# Patient Record
(demographics unavailable — no encounter records)

---

## 2024-11-19 NOTE — HISTORY OF PRESENT ILLNESS
[Nausea] : no nausea [Vomiting] : no vomiting [Diarrhea] : no diarrhea [Constipation] : no constipation [Anorexia] : no anorexia [Sore Throat] : no sore throat [FreeTextEntry8] : Intermittent RLQ discomfort.  Thought it may have been related to mentruation/ovulation.  Pain is dull, intermittent.  BM are daily-every other day, no radiation.  Has history of inguinal hernia repair about 8 years ago.  Has not had colonscopy.

## 2024-11-19 NOTE — PHYSICAL EXAM
[Normal] : no acute distress, well nourished, well developed and well-appearing [Soft] : abdomen soft [Non Tender] : non-tender [Non-distended] : non-distended [No Masses] : no abdominal mass palpated [Normal Bowel Sounds] : normal bowel sounds [No Hernias] : no hernias

## 2025-01-21 NOTE — HISTORY OF PRESENT ILLNESS
[FreeTextEntry1] : Ms. DANIEL OLSON  is a 45 year old  female who has a past medical history significant for Morbid Obesity/Hyperlipidemia  who presents to the office for a follow up evaluation. Patient feels generally well today. Denies SOB, chest pain, palpitations, dizziness, and syncope.  ================ ================ 11/2023 ozempic 0.25mg - 5  following with NIMIS[facility] Oneil for years - stuck at the same weight, couldn't lose  no hx of thyroid CA in family  197lbs  ================= 12/2023 0.25mg ozempic - made a mistake and didnt increase  no side effects  ============= 1/2024 0.5mg ozempic- 2 doses feeling well  187lbs  son having surgery in [place] in feb will be back in march ============== 3/2024 sons sx went well 0.5mg - feeling well 177lbs  =================== 4/2024 1mg 170lbs  no side effects  =============== 6/2024 ozempic 1mg  161lbs  feeling well =============== 9/2024 1mg feeling well 148lbs walking the dog - could be working out more ==================== 11/2024 1mg feeling well 144lbs  =================== 1/2025 0.5mg reports more hunger  146lbs  needs to increase protein - only getting 20 g - needs 90g

## 2025-01-21 NOTE — DISCUSSION/SUMMARY
[FreeTextEntry1] : Obese  - Patient to continue Ozempic 0.5mg once weekly - titrating down   - A detailed discussion took place about the importance of CV risk reduction and LDL lowering.  - The patient understands the importance of incorporating moderate aerobic exercise, 4 times/week for 40 minutes to reduce risk of CV disease. - A detailed discussion of lifestyle modification was done today. Patient understands the importance of a heart healthy diet and incorporating veggies/legumes/fruits/whole grains and limiting processed foods, sugars and carbs in their diet. - patient understands that stress reduction along with good sleep hygiene will help aid in weight loss  -f/u in 8 weeks   - The patient has a good understanding of the diagnosis, treatment plan and lifestyle modification.  she will contact me at the office for any questions with their care or any changes in their health status.  Case discussed with Dr Marciano Garza

## 2025-03-19 NOTE — DISCUSSION/SUMMARY
[FreeTextEntry1] : Obese  - Patient to start Ozempic 1 mg once weekly  - A detailed discussion took place about the importance of CV risk reduction and LDL lowering.  - The patient understands the importance of incorporating moderate aerobic exercise, 4 times/week for 40 minutes to reduce risk of CV disease. - A detailed discussion of lifestyle modification was done today. Patient understands the importance of a heart healthy diet and incorporating veggies/legumes/fruits/whole grains and limiting processed foods, sugars and carbs in their diet. - patient understands that stress reduction along with good sleep hygiene will help aid in weight loss  -f/u in 4 weeks   - The patient has a good understanding of the diagnosis, treatment plan and lifestyle modification.  she will contact me at the office for any questions with their care or any changes in their health status.  Case discussed with Dr Marciano Garza

## 2025-03-19 NOTE — HISTORY OF PRESENT ILLNESS
[FreeTextEntry1] : Ms. DANIEL OLSON  is a 45 year old  female who has a past medical history significant for Morbid Obesity/Hyperlipidemia  who presents to the office for a follow up evaluation. Patient feels generally well today. Denies SOB, chest pain, palpitations, dizziness, and syncope.  ================ ================ 11/2023 ozempic 0.25mg - 5  following with [facility] Oneil for years - stuck at the same weight, couldn't lose  no hx of thyroid CA in family  197lbs  ================= 12/2023 0.25mg ozempic - made a mistake and didnt increase  no side effects  ============= 1/2024 0.5mg ozempic- 2 doses feeling well  187lbs  son having surgery in [place] in feb will be back in march ============== 3/2024 sons sx went well 0.5mg - feeling well 177lbs  =================== 4/2024 1mg 170lbs  no side effects  =============== 6/2024 ozempic 1mg  161lbs  feeling well =============== 9/2024 1mg feeling well 148lbs walking the dog - could be working out more ==================== 11/2024 1mg feeling well 144lbs  =================== 1/2025 0.5mg reports more hunger  146lbs  needs to increase protein - only getting 20 g - needs 90g  =======================    3/19/2025 MED:  Currently on   Ozempic  0.5 mg dose - Appetite suppression has decreased and is struggling at the 0.5 mg dosing. -Reports a 4 lb increase in weight - No side effects, feels well. - Will start 1 mg weekly dose and reassess in one month

## 2025-03-19 NOTE — HISTORY OF PRESENT ILLNESS
[FreeTextEntry1] : Ms. DANIEL OLSON  is a 45 year old  female who has a past medical history significant for Morbid Obesity/Hyperlipidemia  who presents to the office for a follow up evaluation. Patient feels generally well today. Denies SOB, chest pain, palpitations, dizziness, and syncope.  ================ ================ 11/2023 ozempic 0.25mg - 5  following with N[facility] Oneil for years - stuck at the same weight, couldn't lose  no hx of thyroid CA in family  197lbs  ================= 12/2023 0.25mg ozempic - made a mistake and didnt increase  no side effects  ============= 1/2024 0.5mg ozempic- 2 doses feeling well  187lbs  son having surgery in [place] in feb will be back in march ============== 3/2024 sons sx went well 0.5mg - feeling well 177lbs  =================== 4/2024 1mg 170lbs  no side effects  =============== 6/2024 ozempic 1mg  161lbs  feeling well =============== 9/2024 1mg feeling well 148lbs walking the dog - could be working out more ==================== 11/2024 1mg feeling well 144lbs  =================== 1/2025 0.5mg reports more hunger  146lbs  needs to increase protein - only getting 20 g - needs 90g  =======================    3/19/2025 MED:  Currently on   Ozempic  0.5 mg dose - Appetite suppression has decreased and is struggling at the 0.5 mg dosing. -Reports a 4 lb increase in weight - No side effects, feels well. - Will start 1 mg weekly dose and reassess in one month

## 2025-07-15 NOTE — DISCUSSION/SUMMARY
[FreeTextEntry1] : Obese  - Patient to continue Ozempic 1mg once weekly - A detailed discussion took place about the importance of CV risk reduction and LDL lowering.  - The patient understands the importance of incorporating moderate aerobic exercise, 4 times/week for 40 minutes to reduce risk of CV disease. - A detailed discussion of lifestyle modification was done today. Patient understands the importance of a heart healthy diet and incorporating veggies/legumes/fruits/whole grains and limiting processed foods, sugars and carbs in their diet. - patient understands that stress reduction along with good sleep hygiene will help aid in weight loss  -f/u in 6 months  - The patient has a good understanding of the diagnosis, treatment plan and lifestyle modification.  she will contact me at the office for any questions with their care or any changes in their health status.  Case discussed with Dr Marciano Garza

## 2025-07-15 NOTE — HISTORY OF PRESENT ILLNESS
[FreeTextEntry1] : Ms. DANIEL OLSON  is a 45 year old  female who has a past medical history significant for Morbid Obesity/Hyperlipidemia  who presents to the office for a follow up evaluation. Patient feels generally well today. Denies SOB, chest pain, palpitations, dizziness, and syncope.  ================ ================ 11/2023 ozempic 0.25mg - 5  following with N[facility] Oneil for years - stuck at the same weight, couldn't lose  no hx of thyroid CA in family  197lbs  ================= 12/2023 0.25mg ozempic - made a mistake and didnt increase  no side effects  ============= 1/2024 0.5mg ozempic- 2 doses feeling well  187lbs  son having surgery in [place] in feb will be back in march ============== 3/2024 sons sx went well 0.5mg - feeling well 177lbs  =================== 4/2024 1mg 170lbs  no side effects  =============== 6/2024 ozempic 1mg  161lbs  feeling well =============== 9/2024 1mg feeling well 148lbs walking the dog - could be working out more ==================== 11/2024 1mg feeling well 144lbs  =================== 1/2025 0.5mg reports more hunger  146lbs  needs to increase protein - only getting 20 g - needs 90g ================ 7/2025 1mg 135lbs no s/e exercises son doing well - 16y/o

## 2025-07-15 NOTE — HISTORY OF PRESENT ILLNESS
[FreeTextEntry1] : Ms. DANIEL OLSON  is a 45 year old  female who has a past medical history significant for Morbid Obesity/Hyperlipidemia  who presents to the office for a follow up evaluation. Patient feels generally well today. Denies SOB, chest pain, palpitations, dizziness, and syncope.  ================ ================ 11/2023 ozempic 0.25mg - 5  following with NIM[facility] Oneil for years - stuck at the same weight, couldn't lose  no hx of thyroid CA in family  197lbs  ================= 12/2023 0.25mg ozempic - made a mistake and didnt increase  no side effects  ============= 1/2024 0.5mg ozempic- 2 doses feeling well  187lbs  son having surgery in [place] in feb will be back in march ============== 3/2024 sons sx went well 0.5mg - feeling well 177lbs  =================== 4/2024 1mg 170lbs  no side effects  =============== 6/2024 ozempic 1mg  161lbs  feeling well =============== 9/2024 1mg feeling well 148lbs walking the dog - could be working out more ==================== 11/2024 1mg feeling well 144lbs  =================== 1/2025 0.5mg reports more hunger  146lbs  needs to increase protein - only getting 20 g - needs 90g ================ 7/2025 1mg 135lbs no s/e exercises son doing well - 16y/o